# Patient Record
Sex: FEMALE | Race: WHITE | NOT HISPANIC OR LATINO
[De-identification: names, ages, dates, MRNs, and addresses within clinical notes are randomized per-mention and may not be internally consistent; named-entity substitution may affect disease eponyms.]

---
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
## 2022-03-23 DIAGNOSIS — F41.9 ANXIETY: ICD-10-CM

## 2022-04-01 ENCOUNTER — APPOINTMENT (OUTPATIENT)
Dept: FAMILY MEDICINE | Age: 22
End: 2022-04-01

## 2022-04-13 ENCOUNTER — APPOINTMENT (OUTPATIENT)
Dept: FAMILY MEDICINE | Age: 22
End: 2022-04-13

## 2022-06-09 ENCOUNTER — OFFICE VISIT (OUTPATIENT)
Dept: FAMILY MEDICINE | Age: 22
End: 2022-06-09

## 2022-06-09 VITALS
BODY MASS INDEX: 20.57 KG/M2 | SYSTOLIC BLOOD PRESSURE: 98 MMHG | HEIGHT: 66 IN | DIASTOLIC BLOOD PRESSURE: 66 MMHG | WEIGHT: 128 LBS | HEART RATE: 80 BPM

## 2022-06-09 DIAGNOSIS — F41.9 ANXIETY: ICD-10-CM

## 2022-06-09 DIAGNOSIS — Z00.00 PREVENTATIVE HEALTH CARE: Primary | ICD-10-CM

## 2022-06-09 PROCEDURE — 99395 PREV VISIT EST AGE 18-39: CPT | Performed by: FAMILY MEDICINE

## 2022-06-09 RX ORDER — SERTRALINE HYDROCHLORIDE 100 MG/1
100 TABLET, FILM COATED ORAL DAILY
Qty: 30 TABLET | Refills: 1 | Status: SHIPPED | OUTPATIENT
Start: 2022-06-09 | End: 2022-08-03

## 2022-06-09 ASSESSMENT — PATIENT HEALTH QUESTIONNAIRE - PHQ9
2. FEELING DOWN, DEPRESSED OR HOPELESS: NOT AT ALL
SUM OF ALL RESPONSES TO PHQ9 QUESTIONS 1 AND 2: 0
1. LITTLE INTEREST OR PLEASURE IN DOING THINGS: NOT AT ALL
CLINICAL INTERPRETATION OF PHQ2 SCORE: NO FURTHER SCREENING NEEDED
SUM OF ALL RESPONSES TO PHQ9 QUESTIONS 1 AND 2: 0

## 2022-06-24 DIAGNOSIS — F41.9 ANXIETY: ICD-10-CM

## 2022-07-25 RX ORDER — NORGESTIMATE AND ETHINYL ESTRADIOL 0.25-0.035
KIT ORAL
Qty: 28 TABLET | Refills: 11 | Status: SHIPPED | OUTPATIENT
Start: 2022-07-25 | End: 2023-09-11

## 2022-08-03 DIAGNOSIS — F41.9 ANXIETY: ICD-10-CM

## 2022-08-03 RX ORDER — SERTRALINE HYDROCHLORIDE 100 MG/1
TABLET, FILM COATED ORAL
Qty: 30 TABLET | Refills: 1 | Status: SHIPPED | OUTPATIENT
Start: 2022-08-03 | End: 2022-10-01

## 2022-08-11 ENCOUNTER — V-VISIT (OUTPATIENT)
Dept: FAMILY MEDICINE | Age: 22
End: 2022-08-11

## 2022-08-11 DIAGNOSIS — F41.9 ANXIETY: Primary | ICD-10-CM

## 2022-08-11 PROCEDURE — 99213 OFFICE O/P EST LOW 20 MIN: CPT | Performed by: FAMILY MEDICINE

## 2022-08-11 ASSESSMENT — PATIENT HEALTH QUESTIONNAIRE - PHQ9
SUM OF ALL RESPONSES TO PHQ9 QUESTIONS 1 AND 2: 0
2. FEELING DOWN, DEPRESSED OR HOPELESS: NOT AT ALL
1. LITTLE INTEREST OR PLEASURE IN DOING THINGS: NOT AT ALL
CLINICAL INTERPRETATION OF PHQ2 SCORE: NO FURTHER SCREENING NEEDED
SUM OF ALL RESPONSES TO PHQ9 QUESTIONS 1 AND 2: 0

## 2022-09-30 DIAGNOSIS — F41.9 ANXIETY: ICD-10-CM

## 2022-10-01 RX ORDER — SERTRALINE HYDROCHLORIDE 100 MG/1
TABLET, FILM COATED ORAL
Qty: 30 TABLET | Refills: 1 | Status: SHIPPED | OUTPATIENT
Start: 2022-10-01 | End: 2022-12-02

## 2022-12-02 DIAGNOSIS — F41.9 ANXIETY: ICD-10-CM

## 2022-12-02 RX ORDER — SERTRALINE HYDROCHLORIDE 100 MG/1
TABLET, FILM COATED ORAL
Qty: 30 TABLET | Refills: 1 | Status: SHIPPED | OUTPATIENT
Start: 2022-12-02 | End: 2023-02-06

## 2023-02-06 DIAGNOSIS — F41.9 ANXIETY: ICD-10-CM

## 2023-02-06 RX ORDER — SERTRALINE HYDROCHLORIDE 100 MG/1
TABLET, FILM COATED ORAL
Qty: 30 TABLET | Refills: 1 | Status: SHIPPED | OUTPATIENT
Start: 2023-02-06 | End: 2023-04-10

## 2023-04-10 DIAGNOSIS — F41.9 ANXIETY: ICD-10-CM

## 2023-04-10 RX ORDER — SERTRALINE HYDROCHLORIDE 100 MG/1
TABLET, FILM COATED ORAL
Qty: 30 TABLET | Refills: 1 | Status: SHIPPED | OUTPATIENT
Start: 2023-04-10 | End: 2023-06-06

## 2023-06-06 DIAGNOSIS — F41.9 ANXIETY: ICD-10-CM

## 2023-06-06 RX ORDER — SERTRALINE HYDROCHLORIDE 100 MG/1
TABLET, FILM COATED ORAL
Qty: 30 TABLET | Refills: 1 | Status: SHIPPED | OUTPATIENT
Start: 2023-06-06 | End: 2023-08-02

## 2023-07-31 ENCOUNTER — TELEPHONE (OUTPATIENT)
Dept: FAMILY MEDICINE | Age: 23
End: 2023-07-31

## 2023-08-01 ENCOUNTER — E-ADVICE (OUTPATIENT)
Dept: FAMILY MEDICINE | Age: 23
End: 2023-08-01

## 2023-08-02 DIAGNOSIS — F41.9 ANXIETY: ICD-10-CM

## 2023-08-02 RX ORDER — SERTRALINE HYDROCHLORIDE 100 MG/1
TABLET, FILM COATED ORAL
Qty: 30 TABLET | Refills: 1 | Status: SHIPPED | OUTPATIENT
Start: 2023-08-02 | End: 2023-08-09 | Stop reason: DRUGHIGH

## 2023-08-09 ENCOUNTER — OFFICE VISIT (OUTPATIENT)
Dept: FAMILY MEDICINE | Age: 23
End: 2023-08-09

## 2023-08-09 VITALS
RESPIRATION RATE: 18 BRPM | TEMPERATURE: 98.2 F | DIASTOLIC BLOOD PRESSURE: 64 MMHG | SYSTOLIC BLOOD PRESSURE: 112 MMHG | HEIGHT: 66 IN | WEIGHT: 132.8 LBS | BODY MASS INDEX: 21.34 KG/M2

## 2023-08-09 DIAGNOSIS — F41.9 ANXIETY: Primary | ICD-10-CM

## 2023-08-09 PROCEDURE — 99214 OFFICE O/P EST MOD 30 MIN: CPT | Performed by: FAMILY MEDICINE

## 2023-08-09 RX ORDER — SERTRALINE HYDROCHLORIDE 100 MG/1
150 TABLET, FILM COATED ORAL DAILY
Qty: 45 TABLET | Refills: 1 | Status: SHIPPED | OUTPATIENT
Start: 2023-08-09 | End: 2023-10-03

## 2023-08-09 ASSESSMENT — PATIENT HEALTH QUESTIONNAIRE - PHQ9
2. FEELING DOWN, DEPRESSED OR HOPELESS: NOT AT ALL
SUM OF ALL RESPONSES TO PHQ9 QUESTIONS 1 AND 2: 0
SUM OF ALL RESPONSES TO PHQ9 QUESTIONS 1 AND 2: 0
1. LITTLE INTEREST OR PLEASURE IN DOING THINGS: NOT AT ALL
CLINICAL INTERPRETATION OF PHQ2 SCORE: NO FURTHER SCREENING NEEDED

## 2023-09-11 RX ORDER — NORGESTIMATE AND ETHINYL ESTRADIOL 0.25-0.035
KIT ORAL
Qty: 28 TABLET | Refills: 11 | Status: SHIPPED | OUTPATIENT
Start: 2023-09-11

## 2023-09-20 ENCOUNTER — OFFICE VISIT (OUTPATIENT)
Dept: FAMILY MEDICINE | Age: 23
End: 2023-09-20

## 2023-09-20 VITALS
HEIGHT: 66 IN | BODY MASS INDEX: 21.53 KG/M2 | HEART RATE: 96 BPM | WEIGHT: 134 LBS | DIASTOLIC BLOOD PRESSURE: 80 MMHG | SYSTOLIC BLOOD PRESSURE: 116 MMHG

## 2023-09-20 DIAGNOSIS — Z23 NEED FOR VACCINATION: ICD-10-CM

## 2023-09-20 DIAGNOSIS — F41.9 ANXIETY: ICD-10-CM

## 2023-09-20 DIAGNOSIS — Z11.3 ROUTINE SCREENING FOR STI (SEXUALLY TRANSMITTED INFECTION): ICD-10-CM

## 2023-09-20 DIAGNOSIS — Z12.4 SCREENING FOR CERVICAL CANCER: ICD-10-CM

## 2023-09-20 DIAGNOSIS — Z00.00 PREVENTATIVE HEALTH CARE: Primary | ICD-10-CM

## 2023-09-20 PROCEDURE — 87491 CHLMYD TRACH DNA AMP PROBE: CPT | Performed by: INTERNAL MEDICINE

## 2023-09-20 PROCEDURE — 90471 IMMUNIZATION ADMIN: CPT | Performed by: FAMILY MEDICINE

## 2023-09-20 PROCEDURE — 99395 PREV VISIT EST AGE 18-39: CPT | Performed by: FAMILY MEDICINE

## 2023-09-20 PROCEDURE — 90715 TDAP VACCINE 7 YRS/> IM: CPT | Performed by: FAMILY MEDICINE

## 2023-09-20 PROCEDURE — 87591 N.GONORRHOEAE DNA AMP PROB: CPT | Performed by: INTERNAL MEDICINE

## 2023-09-20 PROCEDURE — 88142 CYTOPATH C/V THIN LAYER: CPT | Performed by: INTERNAL MEDICINE

## 2023-09-21 LAB
C TRACH RRNA CVX QL NAA+PROBE: NEGATIVE
Lab: NORMAL
N GONORRHOEA RRNA CVX QL NAA+PROBE: NEGATIVE

## 2023-09-22 LAB
CASE RPRT: NORMAL
CLINICAL INFO: NORMAL
CYTOLOGY CVX/VAG STUDY: NORMAL
HOLD SPECIMEN: NORMAL
PAP EDUCATIONAL NOTE: NORMAL
SPECIMEN ADEQUACY: NORMAL

## 2023-10-02 DIAGNOSIS — F41.9 ANXIETY: ICD-10-CM

## 2023-10-03 RX ORDER — SERTRALINE HYDROCHLORIDE 100 MG/1
TABLET, FILM COATED ORAL
Qty: 45 TABLET | Refills: 1 | Status: SHIPPED | OUTPATIENT
Start: 2023-10-03

## 2023-11-29 DIAGNOSIS — F41.9 ANXIETY: ICD-10-CM

## 2023-11-29 RX ORDER — SERTRALINE HYDROCHLORIDE 100 MG/1
150 TABLET, FILM COATED ORAL DAILY
Qty: 45 TABLET | Refills: 1 | Status: SHIPPED | OUTPATIENT
Start: 2023-11-29

## 2024-01-26 DIAGNOSIS — F41.9 ANXIETY: ICD-10-CM

## 2024-01-26 RX ORDER — SERTRALINE HYDROCHLORIDE 100 MG/1
TABLET, FILM COATED ORAL
Qty: 45 TABLET | Refills: 1 | Status: SHIPPED | OUTPATIENT
Start: 2024-01-26

## 2024-03-20 ENCOUNTER — V-VISIT (OUTPATIENT)
Dept: FAMILY MEDICINE | Age: 24
End: 2024-03-20

## 2024-03-20 DIAGNOSIS — F41.9 ANXIETY: Primary | ICD-10-CM

## 2024-03-20 PROCEDURE — 99213 OFFICE O/P EST LOW 20 MIN: CPT | Performed by: FAMILY MEDICINE

## 2024-03-20 RX ORDER — SERTRALINE HYDROCHLORIDE 100 MG/1
150 TABLET, FILM COATED ORAL DAILY
Qty: 135 TABLET | Refills: 2 | Status: SHIPPED | OUTPATIENT
Start: 2024-03-20

## 2024-03-20 ASSESSMENT — PATIENT HEALTH QUESTIONNAIRE - PHQ9
SUM OF ALL RESPONSES TO PHQ9 QUESTIONS 1 AND 2: 0
1. LITTLE INTEREST OR PLEASURE IN DOING THINGS: NOT AT ALL
2. FEELING DOWN, DEPRESSED OR HOPELESS: NOT AT ALL
SUM OF ALL RESPONSES TO PHQ9 QUESTIONS 1 AND 2: 0
CLINICAL INTERPRETATION OF PHQ2 SCORE: NO FURTHER SCREENING NEEDED

## 2024-05-16 ENCOUNTER — E-ADVICE (OUTPATIENT)
Dept: FAMILY MEDICINE | Age: 24
End: 2024-05-16

## 2024-05-17 RX ORDER — NORGESTIMATE AND ETHINYL ESTRADIOL 0.25-0.035
1 KIT ORAL DAILY
Qty: 84 TABLET | Refills: 3 | Status: SHIPPED | OUTPATIENT
Start: 2024-05-17

## 2024-09-23 SDOH — ECONOMIC STABILITY: FOOD INSECURITY: WITHIN THE PAST 12 MONTHS, THE FOOD YOU BOUGHT JUST DIDN'T LAST AND YOU DIDN'T HAVE MONEY TO GET MORE.: NEVER TRUE

## 2024-09-23 SDOH — ECONOMIC STABILITY: HOUSING INSECURITY: WHAT IS YOUR LIVING SITUATION TODAY?: I HAVE A STEADY PLACE TO LIVE

## 2024-09-23 SDOH — ECONOMIC STABILITY: TRANSPORTATION INSECURITY
IN THE PAST 12 MONTHS, HAS LACK OF RELIABLE TRANSPORTATION KEPT YOU FROM MEDICAL APPOINTMENTS, MEETINGS, WORK OR FROM GETTING THINGS NEEDED FOR DAILY LIVING?: NO

## 2024-09-23 SDOH — ECONOMIC STABILITY: HOUSING INSECURITY: DO YOU HAVE PROBLEMS WITH ANY OF THE FOLLOWING?: LEAD PAINT OR PIPES

## 2024-09-23 ASSESSMENT — SOCIAL DETERMINANTS OF HEALTH (SDOH): IN THE PAST 12 MONTHS, HAS THE ELECTRIC, GAS, OIL, OR WATER COMPANY THREATENED TO SHUT OFF SERVICE IN YOUR HOME?: NO

## 2024-09-25 ENCOUNTER — APPOINTMENT (OUTPATIENT)
Dept: FAMILY MEDICINE | Age: 24
End: 2024-09-25

## 2024-09-25 VITALS
WEIGHT: 137.4 LBS | SYSTOLIC BLOOD PRESSURE: 116 MMHG | BODY MASS INDEX: 22.08 KG/M2 | TEMPERATURE: 97.6 F | DIASTOLIC BLOOD PRESSURE: 60 MMHG | RESPIRATION RATE: 16 BRPM | HEIGHT: 66 IN | HEART RATE: 72 BPM

## 2024-09-25 DIAGNOSIS — F42.9 OBSESSIVE-COMPULSIVE DISORDER, UNSPECIFIED TYPE: ICD-10-CM

## 2024-09-25 DIAGNOSIS — F41.9 ANXIETY: ICD-10-CM

## 2024-09-25 DIAGNOSIS — Z00.00 PREVENTATIVE HEALTH CARE: Primary | ICD-10-CM

## 2024-09-25 PROCEDURE — 99395 PREV VISIT EST AGE 18-39: CPT | Performed by: FAMILY MEDICINE

## 2024-09-25 RX ORDER — NORETHINDRONE ACETATE AND ETHINYL ESTRADIOL 1.5-30(21)
1 KIT ORAL DAILY
Qty: 84 TABLET | Refills: 3 | Status: SHIPPED | OUTPATIENT
Start: 2024-09-25

## 2024-09-25 ASSESSMENT — PATIENT HEALTH QUESTIONNAIRE - PHQ9
2. FEELING DOWN, DEPRESSED OR HOPELESS: NOT AT ALL
SUM OF ALL RESPONSES TO PHQ9 QUESTIONS 1 AND 2: 0
CLINICAL INTERPRETATION OF PHQ2 SCORE: NO FURTHER SCREENING NEEDED
1. LITTLE INTEREST OR PLEASURE IN DOING THINGS: NOT AT ALL
SUM OF ALL RESPONSES TO PHQ9 QUESTIONS 1 AND 2: 0

## 2024-10-01 RX ORDER — NORETHINDRONE ACETATE AND ETHINYL ESTRADIOL 1.5-30(21)
1 KIT ORAL DAILY
Qty: 84 TABLET | Refills: 3 | OUTPATIENT
Start: 2024-10-01

## 2024-12-09 ENCOUNTER — OFFICE VISIT (OUTPATIENT)
Dept: FAMILY MEDICINE CLINIC | Facility: CLINIC | Age: 24
End: 2024-12-09
Payer: COMMERCIAL

## 2024-12-09 VITALS
RESPIRATION RATE: 16 BRPM | DIASTOLIC BLOOD PRESSURE: 78 MMHG | SYSTOLIC BLOOD PRESSURE: 110 MMHG | OXYGEN SATURATION: 98 % | HEIGHT: 66 IN | WEIGHT: 140 LBS | TEMPERATURE: 98 F | HEART RATE: 96 BPM | BODY MASS INDEX: 22.5 KG/M2

## 2024-12-09 DIAGNOSIS — J06.9 UPPER RESPIRATORY TRACT INFECTION, UNSPECIFIED TYPE: Primary | ICD-10-CM

## 2024-12-09 DIAGNOSIS — Z20.828 RSV EXPOSURE: ICD-10-CM

## 2024-12-09 PROCEDURE — 87637 SARSCOV2&INF A&B&RSV AMP PRB: CPT | Performed by: NURSE PRACTITIONER

## 2024-12-09 NOTE — PROGRESS NOTES
CHIEF COMPLAINT:     Chief Complaint   Patient presents with    Upper Respiratory Infection     X 3 day       HPI:   Phoenix Ann Vanhooser is a 24 year old female who presents for upper respiratory symptoms for  3 days. Patient reports sore throat, congestion, dry cough. Symptoms have been worsening since onset.  Treating symptoms with sudafed d.      No current outpatient medications on file.      No past medical history on file.   No past surgical history on file.      Social History     Socioeconomic History    Marital status: Single     Social Drivers of Health     Food Insecurity: Low Risk  (9/23/2024)    Received from [facility]    Food Insecurity     Within the past 12 months, you worried that your food would run out before you got money to buy more.  : Never true     Within the past 12 months, the food you bought just didn't last and you didn't have money to get more. : Never true   Transportation Needs: Not At Risk (9/23/2024)    Received from [facility]    Transportation Needs     In the past 12 months, has lack of reliable transportation kept you from medical appointments, meetings, work or from getting things needed for daily living? : No         REVIEW OF SYSTEMS:   GENERAL: normal appetite  SKIN: no rashes or abnormal skin lesions  HEENT: See HPI  LUNGS: See HPI  CARDIOVASCULAR: denies chest pain or palpitations   GI: denies N/V/C or abdominal pain      EXAM:   /78   Pulse 96   Temp 97.9 °F (36.6 °C)   Resp 16   Ht 5' 6\" (1.676 m)   Wt 140 lb (63.5 kg)   LMP 10/08/2024 (Approximate)   SpO2 98%   BMI 22.60 kg/m²   GENERAL: well developed, well nourished,in no apparent distress  SKIN: no rashes,no suspicious lesions  HEAD: atraumatic, normocephalic.  no tenderness on palpation of  sinuses  EYES: conjunctiva clear, EOM intact  EARS: TM's pearly, no bulging, no retraction,no fluid, bony landmarks visible  NOSE: Nostrils patent, clear nasal discharge, nasal mucosa red  and inflamed   THROAT: Oral mucosa pink, moist. Posterior pharynx is not erythematous. no exudates. Tonsils 1/4.    NECK: Supple, non-tender  LUNGS: clear to auscultation bilaterally, no wheezes or rhonchi. Breathing is non labored.  CARDIO: RRR without murmur  EXTREMITIES: no cyanosis, clubbing or edema  LYMPH:  no lymphadenopathy.        ASSESSMENT AND PLAN:   Phoenix Ann Vanhooser is a 24 year old female who presents with upper respiratory symptoms that are consistent with    ASSESSMENT:   Encounter Diagnoses   Name Primary?    Upper respiratory tract infection, unspecified type Yes    RSV exposure        PLAN: Meds as below.  Comfort care as described in Patient Instructions  Educated on viral vs bacterial  Pt would like viral panel done  Educated on supportive measures: ibuprofen/tylenol, hydration, delsym for cough if needed  Educated on s/s of worsening sx and when to seek higher level of care  Follow up with pcp if not improving      Meds & Refills for this Visit:  Requested Prescriptions      No prescriptions requested or ordered in this encounter     Risks, benefits, and side effects of medication explained and discussed.    The patient indicates understanding of these issues and agrees to the plan.  The patient is asked to f/u with PCP if sx's persist or worsen.  There are no Patient Instructions on file for this visit.

## 2024-12-10 ENCOUNTER — TELEPHONE (OUTPATIENT)
Dept: FAMILY MEDICINE CLINIC | Facility: CLINIC | Age: 24
End: 2024-12-10

## 2024-12-10 LAB
FLUAV + FLUBV RNA SPEC NAA+PROBE: NOT DETECTED
FLUAV + FLUBV RNA SPEC NAA+PROBE: NOT DETECTED
RSV RNA SPEC NAA+PROBE: DETECTED
SARS-COV-2 RNA RESP QL NAA+PROBE: NOT DETECTED

## 2024-12-10 NOTE — TELEPHONE ENCOUNTER
Spoke with Phoenix.  Discussed + RSV.  She is having URI symptoms without labored breathing or wheeze.  Follow up PCP prn.

## 2024-12-12 ENCOUNTER — E-ADVICE (OUTPATIENT)
Dept: FAMILY MEDICINE | Age: 24
End: 2024-12-12

## 2024-12-13 RX ORDER — NORETHINDRONE ACETATE AND ETHINYL ESTRADIOL 1.5-30(21)
1 KIT ORAL DAILY
Qty: 84 TABLET | Refills: 2 | Status: SHIPPED | OUTPATIENT
Start: 2024-12-13

## 2024-12-29 DIAGNOSIS — F41.9 ANXIETY: ICD-10-CM

## 2024-12-30 ENCOUNTER — E-ADVICE (OUTPATIENT)
Dept: FAMILY MEDICINE | Age: 24
End: 2024-12-30

## 2024-12-30 RX ORDER — NORETHINDRONE ACETATE AND ETHINYL ESTRADIOL 1.5-30(21)
1 KIT ORAL DAILY
Qty: 84 TABLET | Refills: 2 | Status: SHIPPED | OUTPATIENT
Start: 2024-12-30 | End: 2025-01-02 | Stop reason: SDUPTHER

## 2024-12-30 RX ORDER — SERTRALINE HYDROCHLORIDE 100 MG/1
150 TABLET, FILM COATED ORAL DAILY
Qty: 135 TABLET | Refills: 3 | Status: SHIPPED | OUTPATIENT
Start: 2024-12-30

## 2025-01-02 RX ORDER — NORETHINDRONE ACETATE AND ETHINYL ESTRADIOL 1.5-30(21)
1 KIT ORAL DAILY
Qty: 84 TABLET | Refills: 2 | Status: SHIPPED | OUTPATIENT
Start: 2025-01-02

## 2025-01-20 ENCOUNTER — OFFICE VISIT (OUTPATIENT)
Dept: FAMILY MEDICINE CLINIC | Facility: CLINIC | Age: 25
End: 2025-01-20
Payer: COMMERCIAL

## 2025-01-20 VITALS
BODY MASS INDEX: 22 KG/M2 | OXYGEN SATURATION: 99 % | WEIGHT: 137.81 LBS | TEMPERATURE: 97 F | RESPIRATION RATE: 20 BRPM | HEART RATE: 100 BPM | DIASTOLIC BLOOD PRESSURE: 78 MMHG | SYSTOLIC BLOOD PRESSURE: 112 MMHG

## 2025-01-20 DIAGNOSIS — J02.9 SORE THROAT: ICD-10-CM

## 2025-01-20 DIAGNOSIS — J02.0 STREP PHARYNGITIS: Primary | ICD-10-CM

## 2025-01-20 LAB
CONTROL LINE PRESENT WITH A CLEAR BACKGROUND (YES/NO): YES YES/NO
KIT LOT #: ABNORMAL NUMERIC

## 2025-01-20 RX ORDER — NORGESTIMATE AND ETHINYL ESTRADIOL 0.25-0.035
1 KIT ORAL DAILY
COMMUNITY
Start: 2024-08-16 | End: 2025-01-20 | Stop reason: ALTCHOICE

## 2025-01-20 RX ORDER — AMOXICILLIN 250 MG/5ML
POWDER, FOR SUSPENSION ORAL
Qty: 200 ML | Refills: 0 | Status: SHIPPED | OUTPATIENT
Start: 2025-01-20

## 2025-01-20 RX ORDER — NORETHINDRONE ACETATE AND ETHINYL ESTRADIOL 1.5-30(21)
1 KIT ORAL DAILY
COMMUNITY
Start: 2025-01-02

## 2025-01-20 RX ORDER — SERTRALINE HYDROCHLORIDE 100 MG/1
150 TABLET, FILM COATED ORAL DAILY
COMMUNITY
Start: 2024-12-30

## 2025-01-20 NOTE — PROGRESS NOTES
CHIEF COMPLAINT:     Chief Complaint   Patient presents with    Sore Throat     Started yesterday fever today        HPI:   Phoenix Ann Vanhooser is a 24 year old female who presents with sore throat, swollen glands, feverish since yesterday.       Associated symptoms:    Fever/Chills  Yes tmax 99  Sore throat  Yes  Cough  No   Congestion No  Bodyache  Yes  Headache  Yes  Chest pain No  SOB/Dyspnea No  Loss of taste No  Loss of smell No  Diarrhea No  Vomiting No      Current Outpatient Medications   Medication Sig Dispense Refill    Norethin Ace-Eth Estrad-FE 1.5-30 MG-MCG Oral Tab Take 1 tablet by mouth daily.      sertraline 100 MG Oral Tab Take 1.5 tablets (150 mg total) by mouth daily.      amoxicillin 250 MG/5ML Oral Recon Susp Take 2 tsp bid for 10 days. 200 mL 0      No past medical history on file.   Social History:  Social History     Socioeconomic History    Marital status: Single   Tobacco Use    Smoking status: Never    Smokeless tobacco: Never   Substance and Sexual Activity    Drug use: Yes     Types: Cannabis     Social Drivers of Health     Food Insecurity: Low Risk  (9/23/2024)    Received from [facility]    Food Insecurity     Within the past 12 months, you worried that your food would run out before you got money to buy more.  : Never true     Within the past 12 months, the food you bought just didn't last and you didn't have money to get more. : Never true   Transportation Needs: Not At Risk (9/23/2024)    Received from [facility]    Transportation Needs     In the past 12 months, has lack of reliable transportation kept you from medical appointments, meetings, work or from getting things needed for daily living? : No        Review of Systems:    Positive for stated complaint: sore throat.   Pertinent positives and negatives noted in the the HPI.    EXAM:   /78   Pulse 100   Temp 97.4 °F (36.3 °C)   Resp 20   Wt 137 lb 12.8 oz (62.5 kg)   LMP 10/08/2024  (Approximate)   SpO2 99%   BMI 22.24 kg/m²   GENERAL: well developed, well nourished,in no apparent distress.  Clear sounding voice.   SKIN: no rashes,no suspicious lesions  HEAD: atraumatic, normocephalic  EYES: conjunctiva clear, sclera white,  PERRLA  EARS: TM's non erythematous  NOSE: nares patent, mucosa mild congestion  THROAT: Posterior pharynx is  erythematous, tonsils 2 + without exudate.  NECK: supple, non-tender  LUNGS: clear to auscultation bilaterally without rale, ronchi, wheeze.  CARDIO: S1/S2 without murmur  EXTREMITIES: no cyanosis, clubbing or edema  LYMPH:  moderate tonsillar lymphadenopathy.      Recent Results (from the past 24 hours)   Strep A Assay W/Optic    Collection Time: 01/20/25  2:31 PM   Result Value Ref Range    Strep Grp A Screen pos Negative    Control Line Present with a clear background (yes/no) yes Yes/No    Kit Lot # [id number] Numeric    Kit Expiration Date 01/30/2025 Date         ASSESSMENT AND PLAN:   Phoenix Ann Vanhooser is a 24 year old female who presents with Sore Throat (Started yesterday fever today ). Symptoms are consistent with:      ASSESSMENT:  Encounter Diagnoses   Name Primary?    Sore throat     Strep pharyngitis Yes         PLAN: RSS is positive.       Symptomatic care:   1. Rest. Drink plenty of fluids.  2. Tylenol or ibuprofen for discomfort or fever.   3. OTC decongestant (phenylephrine) expectorants (guaifenesin), nasal steroid sprays  (fluticasone) may be helpful        for congestion.  4. OTC cough suppressant for cough  (dextromethorphan)  5. Chloraseptic spray/throat lozenges for sore throat   6 amoxil as written.      Go to the ED for evaluation with progressive symptoms of difficulty swallowing, breathing, shortness of breath, chest pain, extreme weakness, or confusion.         Meds & Refills for this Visit:  Requested Prescriptions     Signed Prescriptions Disp Refills    amoxicillin 250 MG/5ML Oral Recon Susp 200 mL 0     Sig: Take 2 tsp bid for 10  days.       Risks, benefits, side effects of medication addressed and explained.    There are no Patient Instructions on file for this visit.    The patient indicates understanding of these issues and agrees to the plan.  The patient is asked to follow up PCP

## 2025-03-25 ENCOUNTER — E-ADVICE (OUTPATIENT)
Dept: FAMILY MEDICINE | Age: 25
End: 2025-03-25

## 2025-03-25 DIAGNOSIS — F41.9 ANXIETY: ICD-10-CM

## 2025-03-25 RX ORDER — SERTRALINE HYDROCHLORIDE 100 MG/1
150 TABLET, FILM COATED ORAL DAILY
Qty: 135 TABLET | Refills: 1 | Status: SHIPPED | OUTPATIENT
Start: 2025-03-25

## 2025-03-25 RX ORDER — SERTRALINE HYDROCHLORIDE 100 MG/1
150 TABLET, FILM COATED ORAL DAILY
Qty: 135 TABLET | Refills: 1 | Status: SHIPPED | OUTPATIENT
Start: 2025-03-25 | End: 2025-03-25 | Stop reason: SDUPTHER

## 2025-05-16 DIAGNOSIS — F41.9 ANXIETY: ICD-10-CM

## 2025-05-16 RX ORDER — SERTRALINE HYDROCHLORIDE 100 MG/1
TABLET, FILM COATED ORAL
Qty: 135 TABLET | Refills: 2 | Status: SHIPPED | OUTPATIENT
Start: 2025-05-16

## 2025-06-30 RX ORDER — NORETHINDRONE ACETATE AND ETHINYL ESTRADIOL AND FERROUS FUMARATE 1.5-30(21)
KIT ORAL
Qty: 28 TABLET | Refills: 8 | Status: SHIPPED | OUTPATIENT
Start: 2025-06-30

## 2025-10-01 ENCOUNTER — APPOINTMENT (OUTPATIENT)
Dept: FAMILY MEDICINE | Age: 25
End: 2025-10-01

## (undated) NOTE — LETTER
Date: 1/20/2025    Patient Name: Phoenix Ann Vanhooser          To Whom it may concern:    This letter has been written at the patient's request. The above patient was seen at [facility] for treatment of a medical condition.  Please excuse her absence.  Return to activities when fever free for 24 hours, has completed at least 24 hours antibiotic, symptoms are improving, and feeling well enough.         Sincerely,    MARY Jung